# Patient Record
Sex: MALE | Race: WHITE | NOT HISPANIC OR LATINO | URBAN - NONMETROPOLITAN AREA
[De-identification: names, ages, dates, MRNs, and addresses within clinical notes are randomized per-mention and may not be internally consistent; named-entity substitution may affect disease eponyms.]

---

## 2020-10-28 ENCOUNTER — IMPORTED ENCOUNTER (OUTPATIENT)
Dept: URBAN - NONMETROPOLITAN AREA CLINIC 1 | Facility: CLINIC | Age: 65
End: 2020-10-28

## 2020-10-28 PROBLEM — H52.4: Noted: 2020-10-28

## 2020-10-28 PROBLEM — H25.13: Noted: 2020-10-28

## 2020-10-28 PROCEDURE — S0620 ROUTINE OPHTHALMOLOGICAL EXA: HCPCS

## 2020-10-28 NOTE — PATIENT DISCUSSION
Presbyopia OUDiscussed refractive status in detail with patient. New glasses Rx given today. Continue to monitor. [place] OUDiscussed diagnosis with patient. Reviewed symptoms related to cataract progression. Discussed various treatment options with patient. Recommend cataract evaluation by Dr. Louise Malik. Patient elects to schedule cat eval.Continue to monitor.

## 2020-12-07 ENCOUNTER — IMPORTED ENCOUNTER (OUTPATIENT)
Dept: URBAN - NONMETROPOLITAN AREA CLINIC 1 | Facility: CLINIC | Age: 65
End: 2020-12-07

## 2020-12-07 PROCEDURE — 99204 OFFICE O/P NEW MOD 45 MIN: CPT

## 2020-12-07 NOTE — PATIENT DISCUSSION
Cataract(s)-Visually significant cataract OU .-Cataract(s) causing symptomatic impairment of visual function not correctable with a tolerable change in glasses or contact lenses lighting or non-operative means resulting in specific activity limitations and/or participation restrictions including but not limited to reading viewing television driving or meeting vocational or recreational needs. -Expectation is clearer vision and functional improvement in symptoms as well as reduced glare disability after cataract removal.-Order IOLMaster and OPD today. -Recommend Stand/Trad based on today's OPD testing and lifestyle questionnaire.-All questions were answered regarding surgery including pre and post-op medications appointments activity restrictions and anesthetic usage.-The risks benefits and alternatives and special risk factors for the patient were discussed in detail including but not limited to: bleeding infection retinal detachment vitreous loss problems with the implant and possible need for additional surgery.-Although rare the possibility of complete vision loss was discussed.-The possible need for glasses post-operatively was discussed.-Order medical clearance exam based on history of high cholesterol and hypertension and COPD-Patient elects to proceed with cataract surgery OS . Will schedule at patient's convenience and re-evaluate OD  in the future. Discussed all lens options in detail with patient. Discussed Stand/Trad and he elects to proceed with this lens. Discussed with patient he may need mild rx for distance s/p but the need for glasses for reading was explained to patient.

## 2021-01-06 ENCOUNTER — IMPORTED ENCOUNTER (OUTPATIENT)
Dept: URBAN - NONMETROPOLITAN AREA CLINIC 1 | Facility: CLINIC | Age: 66
End: 2021-01-06

## 2021-01-06 PROBLEM — H52.4: Noted: 2021-01-06

## 2021-01-06 PROBLEM — H25.13: Noted: 2021-01-06

## 2021-01-06 PROBLEM — I10: Noted: 2021-01-06

## 2021-01-06 PROBLEM — Z01.818: Noted: 2021-01-06

## 2021-01-06 PROBLEM — M19.90: Noted: 2021-01-06

## 2021-01-06 PROBLEM — E78.5: Noted: 2021-01-06

## 2021-01-21 ENCOUNTER — IMPORTED ENCOUNTER (OUTPATIENT)
Dept: URBAN - NONMETROPOLITAN AREA CLINIC 1 | Facility: CLINIC | Age: 66
End: 2021-01-21

## 2021-01-21 PROCEDURE — 66982 XCAPSL CTRC RMVL CPLX WO ECP: CPT

## 2021-01-21 PROCEDURE — 99024 POSTOP FOLLOW-UP VISIT: CPT

## 2021-01-21 PROCEDURE — 92136 OPHTHALMIC BIOMETRY: CPT

## 2021-01-22 NOTE — PATIENT DISCUSSION
PT HAVING DIFFICULTY READING - DESPITE CLEARING OF CME - RECOM EVAL FOR YAG TO SEE IF IT HELPS WITH [facility] - IF NOT CONSIDER EVAL WITH VKG FOR PLG.

## 2021-01-27 ENCOUNTER — IMPORTED ENCOUNTER (OUTPATIENT)
Dept: URBAN - NONMETROPOLITAN AREA CLINIC 1 | Facility: CLINIC | Age: 66
End: 2021-01-27

## 2021-01-27 PROBLEM — Z98.42: Noted: 2021-01-27

## 2021-01-27 PROBLEM — H25.811: Noted: 2021-01-27

## 2021-01-27 PROCEDURE — 99024 POSTOP FOLLOW-UP VISIT: CPT

## 2021-01-27 NOTE — PATIENT DISCUSSION
1 week s/p PCIOL OS stand/trad (01/21/21)-Pt doing well at 1 week s/p PCIOL OD.-Continue post-op gtts according to instruction sheet.-Okay to resume usual activities and d/c eye shield. Cataract OS- Recommend proceed with cataract surgery as previously scheduled and discussed with Dr. Herminia Sullivan.

## 2021-01-27 NOTE — PATIENT DISCUSSION
The cataracts are responsible for the patient's changes in vision and may consider cataract surgery after yag OS to improve quality and clarity of vision.

## 2021-02-04 ENCOUNTER — IMPORTED ENCOUNTER (OUTPATIENT)
Dept: URBAN - NONMETROPOLITAN AREA CLINIC 1 | Facility: CLINIC | Age: 66
End: 2021-02-04

## 2021-02-04 PROBLEM — Z98.42: Noted: 2021-02-04

## 2021-02-04 PROBLEM — Z98.41: Noted: 2021-02-04

## 2021-02-04 PROCEDURE — 66982 XCAPSL CTRC RMVL CPLX WO ECP: CPT

## 2021-02-04 PROCEDURE — 99024 POSTOP FOLLOW-UP VISIT: CPT

## 2021-02-04 PROCEDURE — 92136 OPHTHALMIC BIOMETRY: CPT

## 2021-02-04 NOTE — PATIENT DISCUSSION
Same day s/p PCIOL OD stand/trad (02/04/21)-Pt doing well s/p PCIOL. -Continue post-op gtts according to instruction sheet and sleep with eye shield over eye for 7 nights.-Avoid bending at the waist lifting anything over 5lbs and dirty or linda environments. 2 week s/p PCIOL OS stand/trad (01/21/21)-Pt doing well at 2 week s/p PCIOL OS.-Continue post-op gtts according to instruction sheet.-Okay to resume usual activities and d/c eye shield.

## 2021-02-11 ENCOUNTER — IMPORTED ENCOUNTER (OUTPATIENT)
Dept: URBAN - NONMETROPOLITAN AREA CLINIC 1 | Facility: CLINIC | Age: 66
End: 2021-02-11

## 2021-02-11 PROCEDURE — 99024 POSTOP FOLLOW-UP VISIT: CPT

## 2021-02-11 NOTE — PATIENT DISCUSSION
1 week s/p PCIOL OD stand/trad (02/04/21)-Pt doing well at 1 week s/p PCIOL OD.-Continue post-op gtts according to instruction sheet.-Okay to resume usual activities and d/c eye shield. 3 week s/p PCIOL OS stand/trad (01/21/21)-Pt doing well at 3 week s/p PCIOL OS.-Continue post-op gtts according to instruction sheet.-Okay to resume usual activities.

## 2021-02-25 NOTE — PATIENT DISCUSSION
2/25/21: PT IS UNHAPPY W [facility] OS>OD.VERY UPSET ABOUT CE Angelique Short DON'T RECOMMEND SX AT THIS TIME.  HAD A LONG DISCUSSION REGARDING EXPECTATIONS IF SX IN OS TAKES PLACE.

## 2021-02-25 NOTE — PATIENT DISCUSSION
2/25/21: MONOFOCAL. GOOD POSITION. PT W DYSPHOTOPSIAS. UNHAPPY W 20/25 [facility]. DONT THINK SX IN OS WILL IMPROVE SIGNIFICANTLY SPECIFIC COMPLAINTS. TX CME FIRST.

## 2021-02-25 NOTE — PATIENT DISCUSSION
2/25/21: IOP within reasonable range TODAY. LARGE C/D RATIO. MONITOR HVF NEXT VISIT TO DETERMINE IF SX WOULD BE APPROPRIATE.

## 2021-02-25 NOTE — PATIENT DISCUSSION
2/25/21: [place] MEDS. RESTART THEM.  IF NO IMPROVEMENT, CONSIDER PST VS TRIESENCE BEFORE SX.

## 2021-02-28 PROBLEM — Z98.42: Noted: 2021-02-28

## 2021-02-28 PROBLEM — H25.811: Noted: 2021-02-28

## 2021-03-16 ENCOUNTER — IMPORTED ENCOUNTER (OUTPATIENT)
Dept: URBAN - NONMETROPOLITAN AREA CLINIC 1 | Facility: CLINIC | Age: 66
End: 2021-03-16

## 2021-03-16 PROCEDURE — 99024 POSTOP FOLLOW-UP VISIT: CPT

## 2021-03-16 NOTE — PATIENT DISCUSSION
1 month s/p PCIOL OD stand/trad (02/04/21)-Pt doing well at 1 month s/p PCIOL OD.-Okay to resume usual activities. 2 month s/p PCIOL OS stand/trad (01/21/21)-Pt doing well at 2 month s/p PCIOL OD.-Okay to resume usual activities.

## 2021-04-16 NOTE — PATIENT DISCUSSION
4 16 21 PT CONTINUES TO HAVE DIF WITH HER [facility] AND WANTS TO PROCEED WITH INJECTION TO SEE IF IT HELPS.

## 2021-04-16 NOTE — PATIENT DISCUSSION
2/25/21: PT IS UNHAPPY W [facility] OS>OD.VERY UPSET ABOUT JANIE Soto DON'T RECOMMEND SX AT THIS TIME.  HAD A LONG DISCUSSION REGARDING EXPECTATIONS IF SX IN OS TAKES PLACE.

## 2021-04-16 NOTE — PROCEDURE NOTE: CLINICAL
PROCEDURE NOTE: Intravitreal Ozurdex OS. Diagnosis: Posterior Uveitis. Anesthesia: Topical. Prep: Betadine Flush. Prior to injection, risks/benefits/alternatives discussed including infection, loss of vision, hemorrhage, cataract, glaucoma, retinal tears or detachment and patient wished to proceed. The patient was seated in the examination chair. Topical anesthesia was induced with Proparicaine 0.5%. Subconjunctival xylocaine 2% approximately 0.5 cc was given for anesthesia. Betadine Prep was performed. The Ozurdex drug implant (dexamethasone 0.7 mg intravitreal implant) was injected into the vitreous cavity. The needle was passed 3.0 mm posterior to the limbus in pseudophakic patients, and 3.5 mm posterior to the limbus in phakic patients. The eye was stabilized using a q tip or cotton tip applicator, and the conjunctiva was displaced from the injection site. The remainder of the intravitreal Ozurdex in the single-use vial was then discarded in a medical waste disposal container. The implant settled inferiorly. The retina was examined following the injection. There was no evidence of hemorrhage, subretinal injection, or retinal detachment. Patient tolerated procedure well. There were no complications. Post-op instructions given. Lot # [id number]. Expiration date: */*. The patient was instructed of a follow up appointment in approximately 6 weeks for a limited exam and pressure check and was told to call immediately if the vision decreases and/or if the patient’s eye becomes red, painful, and/or light sensitive. If the patient is unable to reach the doctor within an hour or two, the patient is instructed to go to the emergency room or call 911. Inventory Id: *. The patient was instructed to use Artificial Tears q.i.d. p.r.n for comfort. Kasia Denny

## 2021-04-30 NOTE — PATIENT DISCUSSION
2/25/21: PT IS UNHAPPY W [facility] OS>OD.VERY UPSET ABOUT CE Noble Dewitts DON'T RECOMMEND SX AT THIS TIME.  HAD A LONG DISCUSSION REGARDING EXPECTATIONS IF SX IN OS TAKES PLACE.

## 2021-05-21 NOTE — PATIENT DISCUSSION
2/25/21: PT IS UNHAPPY W [facility] OS>OD.VERY UPSET ABOUT CE Kermit Cummins DON'T RECOMMEND SX AT THIS TIME.  HAD A LONG DISCUSSION REGARDING EXPECTATIONS IF SX IN OS TAKES PLACE.

## 2021-06-02 ENCOUNTER — IMPORTED ENCOUNTER (OUTPATIENT)
Dept: URBAN - NONMETROPOLITAN AREA CLINIC 1 | Facility: CLINIC | Age: 66
End: 2021-06-02

## 2021-06-02 PROBLEM — Z96.1: Noted: 2021-06-02

## 2021-06-02 PROBLEM — H43.812: Noted: 2021-06-02

## 2021-06-02 PROCEDURE — 99213 OFFICE O/P EST LOW 20 MIN: CPT

## 2021-06-02 NOTE — PATIENT DISCUSSION
PVD OSDiscussed findings of exam in detail with the patient. The risk of retinal detachment in patients with PVDs was discussed with the patient and the warning signs of retinal detachment were carefully reviewed with the patient. The patient was warned to return to the office or contact the ophthalmologist on call immediately if they experience signs of retinal detachment. Recommend further evaluation with [facility]. P/C IOL OUDiscussed diagnosis in detail with patient. Both intraocular implants in place and stable. Continue to monitor.

## 2021-09-27 NOTE — PATIENT DISCUSSION
9/27/21: SECONDARY TO ERM. THERE IS POSSIBLE EFFECT FROM LATANOPROST. TO FOLLOW ABEBA HURTADO & DR WATKINS. WE'LL ADD DORZOLAMIDE.

## 2021-09-27 NOTE — PATIENT DISCUSSION
9/27/21: VA HAS WORSENED AND ERM SHOWS SIGNS OF PROGRESSION ON OCT. RECOMMEND SX. PT FEELS UNEASY.  REC ANOTHER OPINION BY HW.

## 2021-09-27 NOTE — PATIENT DISCUSSION
SPENT ~20 MINS DISCUSSING HER CASE, ANSWERING ALL HER QUESTIONS, PROGNOSIS & TX OPTIONS. DISCUSSED THAT GIVEN CUPPING OF ONH, RECOMMEND PERFORMING HVF BEFORE PROCEEDING W PPV.

## 2021-09-28 NOTE — PATIENT DISCUSSION
2/25/21: PT IS UNHAPPY W [facility] OS>OD.VERY UPSET ABOUT CE Delvin Gibbons DON'T RECOMMEND SX AT THIS TIME.  HAD A LONG DISCUSSION REGARDING EXPECTATIONS IF SX IN OS TAKES PLACE.

## 2021-09-28 NOTE — PATIENT DISCUSSION
9/28/21 TO HOLD LATANOPROST, EXPLAINED COULD BE CAUSING CME, START DORZOLAMIDE, THEN RECHECK, PLUS KETOROLAC QHS.

## 2021-11-17 NOTE — PATIENT DISCUSSION
11/17/21 USED KETOROLAC AND DORZOLAMIDE FOR A FEW DAYS , STOPPED AS IT WAS BURNING. NOW IS NOT USING DROPS. ME IMPROVED SO LATANOPROST. IOP IS CONTROLLED.  TO BP.

## 2022-04-15 ASSESSMENT — VISUAL ACUITY
OS_AM: 20/40-
OD_GLARE: 20/80-
OS_AM: 20/40-
OS_CC: 20/200
OD_PH: 20/50
OD_CC: 20/20
OD_PAM: 20/20
OD_CC: 20/15
OS_CC: 20/90
OS_GLARE: 20/400
OD_GLARE: 20/80-
OS_CC: 20/20-
OS_CC: 20/15
OS_CC: 20/20-
OD_GLARE: 20/80-
OD_CC: 20/25-2
OS_GLARE: 20/400
OS_PH: 20/40
OD_CC: 20/40-1
OD_GLARE: 20/80-
OS_PH: 20/40
OD_CC: 20/25-2
OD_CC: 20/25-2
OD_CC: 20/20-1
OD_PAM: 20/20
OD_PAM: 20/20
OD_CC: 20/25
OS_CC: 20/20-2
OS_CC: 20/20-2
OS_CC: 20/200

## 2022-04-15 ASSESSMENT — TONOMETRY
OS_IOP_MMHG: 14
OS_IOP_MMHG: 16
OD_IOP_MMHG: 14
OS_IOP_MMHG: 16
OD_IOP_MMHG: 19
OS_IOP_MMHG: 16
OD_IOP_MMHG: 16
OD_IOP_MMHG: 16
OS_IOP_MMHG: 18
OS_IOP_MMHG: 19
OS_IOP_MMHG: 14
OS_IOP_MMHG: 17
OD_IOP_MMHG: 16
OD_IOP_MMHG: 16
OD_IOP_MMHG: 14
OD_IOP_MMHG: 16

## 2023-01-01 NOTE — PATIENT DISCUSSION
Same Day s/p PCIOL OS stand/trad (01/21/21)-Pt doing well s/p PCIOL. -Continue post-op gtts according to instruction sheet and sleep with eye shield over eye for 7 nights.-Avoid bending at the waist lifting anything over 5lbs and dirty or linda environments. Cataract OD- Recommend proceed with cataract surgery as previously scheduled and discussed with Dr. Chapito Wood.
Yes